# Patient Record
Sex: FEMALE | Race: WHITE | NOT HISPANIC OR LATINO | URBAN - METROPOLITAN AREA
[De-identification: names, ages, dates, MRNs, and addresses within clinical notes are randomized per-mention and may not be internally consistent; named-entity substitution may affect disease eponyms.]

---

## 2017-08-21 ENCOUNTER — APPOINTMENT (RX ONLY)
Dept: URBAN - METROPOLITAN AREA OTHER 10 | Facility: OTHER | Age: 22
Setting detail: DERMATOLOGY
End: 2017-08-21

## 2017-08-21 DIAGNOSIS — L21.8 OTHER SEBORRHEIC DERMATITIS: ICD-10-CM

## 2017-08-21 DIAGNOSIS — D22 MELANOCYTIC NEVI: ICD-10-CM

## 2017-08-21 DIAGNOSIS — L663 OTHER SPECIFIED DISEASES OF HAIR AND HAIR FOLLICLES: ICD-10-CM

## 2017-08-21 DIAGNOSIS — L73.9 FOLLICULAR DISORDER, UNSPECIFIED: ICD-10-CM

## 2017-08-21 DIAGNOSIS — L738 OTHER SPECIFIED DISEASES OF HAIR AND HAIR FOLLICLES: ICD-10-CM

## 2017-08-21 DIAGNOSIS — L81.4 OTHER MELANIN HYPERPIGMENTATION: ICD-10-CM

## 2017-08-21 PROBLEM — D22.5 MELANOCYTIC NEVI OF TRUNK: Status: ACTIVE | Noted: 2017-08-21

## 2017-08-21 PROBLEM — J45.909 UNSPECIFIED ASTHMA, UNCOMPLICATED: Status: ACTIVE | Noted: 2017-08-21

## 2017-08-21 PROBLEM — L02.32 FURUNCLE OF BUTTOCK: Status: ACTIVE | Noted: 2017-08-21

## 2017-08-21 PROBLEM — F41.9 ANXIETY DISORDER, UNSPECIFIED: Status: ACTIVE | Noted: 2017-08-21

## 2017-08-21 PROCEDURE — ? COUNSELING

## 2017-08-21 PROCEDURE — ? PRESCRIPTION

## 2017-08-21 PROCEDURE — 99203 OFFICE O/P NEW LOW 30 MIN: CPT

## 2017-08-21 PROCEDURE — ? TREATMENT REGIMEN

## 2017-08-21 PROCEDURE — ? OBSERVATION

## 2017-08-21 RX ORDER — BENZOYL PEROXIDE 9.8 %
1 FOAM (GRAM) TOPICAL QD
Qty: 1 | Refills: 1 | Status: ERX | COMMUNITY
Start: 2017-08-21

## 2017-08-21 RX ADMIN — Medication 1: at 13:52

## 2017-08-21 ASSESSMENT — LOCATION DETAILED DESCRIPTION DERM
LOCATION DETAILED: LEFT MEDIAL BUTTOCK
LOCATION DETAILED: LEFT SUPERIOR MEDIAL UPPER BACK
LOCATION DETAILED: LEFT CENTRAL FRONTAL SCALP
LOCATION DETAILED: INFERIOR THORACIC SPINE

## 2017-08-21 ASSESSMENT — LOCATION SIMPLE DESCRIPTION DERM
LOCATION SIMPLE: UPPER BACK
LOCATION SIMPLE: LEFT SCALP
LOCATION SIMPLE: LEFT UPPER BACK
LOCATION SIMPLE: LEFT BUTTOCK

## 2017-08-21 ASSESSMENT — LOCATION ZONE DERM
LOCATION ZONE: TRUNK
LOCATION ZONE: SCALP

## 2017-08-21 NOTE — PROCEDURE: TREATMENT REGIMEN
Otc Regimen: Alba face and body scrub, wash body and face daily
Initiate Treatment: Benzefoam, Apply to affected areas, let sit for 3-5 minutes then rinse off in shower, repeat daily
Detail Level: Simple
Otc Regimen: Head and Shoulders wash scalp 3 times weekly. Add 1 to 2 drops of tea tree oil to shampoo bottle for additional treatment as needed for itching and flacking